# Patient Record
Sex: MALE | Race: WHITE | NOT HISPANIC OR LATINO
[De-identification: names, ages, dates, MRNs, and addresses within clinical notes are randomized per-mention and may not be internally consistent; named-entity substitution may affect disease eponyms.]

---

## 2021-03-23 PROBLEM — Z00.00 ENCOUNTER FOR PREVENTIVE HEALTH EXAMINATION: Status: ACTIVE | Noted: 2021-03-23

## 2022-10-24 ENCOUNTER — NON-APPOINTMENT (OUTPATIENT)
Age: 42
End: 2022-10-24

## 2022-10-24 DIAGNOSIS — I10 ESSENTIAL (PRIMARY) HYPERTENSION: ICD-10-CM

## 2022-10-24 RX ORDER — ATORVASTATIN CALCIUM 40 MG/1
40 TABLET, FILM COATED ORAL
Refills: 0 | Status: ACTIVE | COMMUNITY

## 2022-10-24 RX ORDER — METFORMIN HYDROCHLORIDE 1000 MG/1
1000 TABLET, COATED ORAL
Refills: 0 | Status: ACTIVE | COMMUNITY

## 2022-11-17 ENCOUNTER — APPOINTMENT (OUTPATIENT)
Dept: ENDOCRINOLOGY | Facility: CLINIC | Age: 42
End: 2022-11-17

## 2022-11-17 VITALS
TEMPERATURE: 97.7 F | OXYGEN SATURATION: 96 % | SYSTOLIC BLOOD PRESSURE: 130 MMHG | HEART RATE: 92 BPM | BODY MASS INDEX: 30.49 KG/M2 | HEIGHT: 70 IN | WEIGHT: 213 LBS | DIASTOLIC BLOOD PRESSURE: 64 MMHG

## 2022-11-17 PROCEDURE — 99214 OFFICE O/P EST MOD 30 MIN: CPT

## 2022-11-17 RX ORDER — LISINOPRIL 30 MG/1
TABLET ORAL
Refills: 0 | Status: DISCONTINUED | COMMUNITY
End: 2022-11-17

## 2022-11-17 NOTE — PROCEDURE
[FreeTextEntry1] : This is a 42-year-old white male with a past medical history of type 2 diabetes hyperlipidemia who is currently stable and tolerating all his medications.  He is very well compliant with his diet and exercise.  He has not had any episodes of hypoglycemia abdominal pain or dysuria.  He recently had a blood work on 10/20/2022 which showed that his complete metabolic panel is normal, total cholesterol was 115 mg per DL LDL is 64 mg per DL, and his hemoglobin A1c was 5.2%.  There is no evidence of any vascular complications at the present.  Recommendation\par 1.  Patient is to continue his current medication which include Jardiance 10 mg tablets every day, Trulicity 3 mg subcutaneously every week and metformin 1000 mg tablets daily.  Patient is also using atorvastatin 40 mg daily and lisinopril with hydrochlorothiazide.\par 2.  The importance of diet exercise and hypoglycemia was discussed with the patient.\par 3.  Follow-up visit has been arranged in approximately 4 to 5 months after he obtains a repeat blood test through the office of his primary care physician.  The plan was discussed in detail with the patient thank you

## 2022-11-17 NOTE — PHYSICAL EXAM
[Alert] : alert [Well Nourished] : well nourished [No Acute Distress] : no acute distress [Well Developed] : well developed [Normal Sclera/Conjunctiva] : normal sclera/conjunctiva [PERRL] : pupils equal, round and reactive to light [Normal Outer Ear/Nose] : the ears and nose were normal in appearance [No Neck Mass] : no neck mass was observed [Clear to Auscultation] : lungs were clear to auscultation bilaterally [Normal to Percussion] : lungs were normal to percussion [Normal S1, S2] : normal S1 and S2 [Normal Rate] : heart rate was normal [Regular Rhythm] : with a regular rhythm [Carotids Normal] : carotid pulses were normal with no bruits [No Edema] : no peripheral edema [Pedal Pulses Normal] : the pedal pulses are present [Normal Appearance] : normal in appearance [Normal Bowel Sounds] : normal bowel sounds [Not Tender] : non-tender [Not Distended] : not distended [No HSM] : no hepato-splenomegaly [Normal Supraclavicular Nodes] : no supraclavicular lymphadenopathy [No CVA Tenderness] : no ~M costovertebral angle tenderness [No Spinal Tenderness] : no spinal tenderness [No Stigmata of Cushings Syndrome] : no stigmata of Cushings Syndrome [Normal Gait] : normal gait [No Clubbing, Cyanosis] : no clubbing  or cyanosis of the fingernails [No Involuntary Movements] : no involuntary movements were seen [Normal Strength/Tone] : muscle strength and tone were normal [No Rash] : no rash [No Skin Lesions] : no skin lesions [No Sensory Deficits] : the sensory exam was normal to light touch and pinprick [Normal Reflexes] : deep tendon reflexes were 2+ and symmetric [No Tremors] : no tremors [Oriented x3] : oriented to person, place, and time [FreeTextEntry1] : Deferred [de-identified] : Deferred

## 2022-11-17 NOTE — HISTORY OF PRESENT ILLNESS
[FreeTextEntry1] : pt coming in to follow up on diabetes. \par Initially Young white male with a past medical history of type 2 diabetes hyperlipidemia and hypertension presents for routine follow-up.  Patient currently denies any significant symptoms.  Patient does not monitor his sugar levels at home mostly strictly compliant with his diet and exercise.  He has significantly reduced the intake of carbohydrates and starches, he is exercising on a daily basis and he has lost approximately 8 pounds since his last visit.  He denies any chest pain shortness of breath headache blurry vision nausea vomiting abdominal pain or dysuria.  He has not noticed any change in his vision or numbness of extremities.

## 2023-03-20 ENCOUNTER — APPOINTMENT (OUTPATIENT)
Dept: ENDOCRINOLOGY | Facility: CLINIC | Age: 43
End: 2023-03-20
Payer: COMMERCIAL

## 2023-03-20 VITALS
DIASTOLIC BLOOD PRESSURE: 60 MMHG | SYSTOLIC BLOOD PRESSURE: 128 MMHG | OXYGEN SATURATION: 97 % | HEART RATE: 90 BPM | WEIGHT: 214 LBS | BODY MASS INDEX: 30.64 KG/M2 | TEMPERATURE: 98.3 F | HEIGHT: 70 IN

## 2023-03-20 DIAGNOSIS — E66.9 OBESITY, UNSPECIFIED: ICD-10-CM

## 2023-03-20 PROCEDURE — 99213 OFFICE O/P EST LOW 20 MIN: CPT

## 2023-03-20 NOTE — ASSESSMENT
[Diabetes Foot Care] : diabetes foot care [Long Term Vascular Complications] : long term vascular complications of diabetes [Carbohydrate Consistent Diet] : carbohydrate consistent diet [Importance of Diet and Exercise] : importance of diet and exercise to improve glycemic control, achieve weight loss and improve cardiovascular health [Exercise/Effect on Glucose] : exercise/effect on glucose [Hypoglycemia Management] : hypoglycemia management [Retinopathy Screening] : Patient was referred to ophthalmology for retinopathy screening [FreeTextEntry1] : Young white male with a past medical history of a type 2 diabetes who is currently on oral agents together with a GLP analog.  Patient's last blood test from January 31, 2023 showed a normal complete metabolic panel hemoglobin A1c was 5.3% GFR is 102 magnesium is 1.9 TSH is 0.752 and the vitamin D level is 55.9.  Patient is clinically stable with excellent glycemic control.  Patient is trying very hard to maintain a normal weight and is following his diet very closely.  Recommendation\par 1.  I have advised the patient to continue his current diabetic medications Jardiance 10 mg daily metformin 1000 mg twice a day and Trulicity 3 mg once a week.  #2 patient will continue with the atorvastatin 40 mg daily and also lisinopril with hydrochlorothiazide 20/25 mg once a day.\par 2.  The importance of diet exercise weight loss was again stressed upon the patient.\par 3.  Side effects of the medications  were discussed with the patient and the prevention also was explained\par 4.  She will return to the office in 4 months time after having a repeat blood analysis.  Plan discussed with the patient thank you

## 2023-03-20 NOTE — HISTORY OF PRESENT ILLNESS
[FreeTextEntry1] : 43-year-old white male with a past medical history of type 2 diabetes, hypertension hyperlipidemia presents for routine follow-up.  Patient is currently taking Trulicity 3 mg every week, metformin 1000 mg twice a day Jardiance 10 mg daily and atorvastatin 40 mg daily.  He denies any significant symptoms of polyuria or polydipsia.  He is very compliant with his diet and has been avoiding concentrated starches in his meals.  Physically he is quite active and is exercising on a regular basis.  However his weight has not changed much.  He does not monitor his sugar levels at home.  He denies chest pain shortness of breath nausea vomiting dysuria.  His appetite has significantly diminished.

## 2023-03-30 ENCOUNTER — RX RENEWAL (OUTPATIENT)
Age: 43
End: 2023-03-30

## 2023-08-09 ENCOUNTER — APPOINTMENT (OUTPATIENT)
Dept: ENDOCRINOLOGY | Facility: CLINIC | Age: 43
End: 2023-08-09

## 2023-11-30 LAB — HBA1C MFR BLD HPLC: 5.3

## 2024-02-19 ENCOUNTER — APPOINTMENT (OUTPATIENT)
Dept: ENDOCRINOLOGY | Facility: CLINIC | Age: 44
End: 2024-02-19
Payer: COMMERCIAL

## 2024-02-19 VITALS
BODY MASS INDEX: 31.5 KG/M2 | DIASTOLIC BLOOD PRESSURE: 68 MMHG | WEIGHT: 220 LBS | RESPIRATION RATE: 16 BRPM | SYSTOLIC BLOOD PRESSURE: 128 MMHG | TEMPERATURE: 98.7 F | OXYGEN SATURATION: 97 % | HEART RATE: 110 BPM | HEIGHT: 70 IN

## 2024-02-19 DIAGNOSIS — E78.5 HYPERLIPIDEMIA, UNSPECIFIED: ICD-10-CM

## 2024-02-19 DIAGNOSIS — E11.9 TYPE 2 DIABETES MELLITUS W/OUT COMPLICATIONS: ICD-10-CM

## 2024-02-19 PROCEDURE — 99214 OFFICE O/P EST MOD 30 MIN: CPT

## 2024-02-19 PROCEDURE — G2211 COMPLEX E/M VISIT ADD ON: CPT

## 2024-02-19 RX ORDER — DULAGLUTIDE 1.5 MG/.5ML
1.5 INJECTION, SOLUTION SUBCUTANEOUS
Qty: 12 | Refills: 1 | Status: DISCONTINUED | COMMUNITY
Start: 2023-01-30 | End: 2024-02-19

## 2024-02-19 RX ORDER — LISINOPRIL AND HYDROCHLOROTHIAZIDE TABLETS 20; 25 MG/1; MG/1
20-25 TABLET ORAL
Refills: 0 | Status: ACTIVE | COMMUNITY

## 2024-02-19 NOTE — ASSESSMENT
[Diabetes Foot Care] : diabetes foot care [Long Term Vascular Complications] : long term vascular complications of diabetes [Carbohydrate Consistent Diet] : carbohydrate consistent diet [Importance of Diet and Exercise] : importance of diet and exercise to improve glycemic control, achieve weight loss and improve cardiovascular health [Exercise/Effect on Glucose] : exercise/effect on glucose [Hypoglycemia Management] : hypoglycemia management [Self Monitoring of Blood Glucose] : self monitoring of blood glucose [Retinopathy Screening] : Patient was referred to ophthalmology for retinopathy screening [FreeTextEntry1] : -year-old white male who has a past medical history of for type 2 diabetes currently on oral medication together with Trulicity which she is tolerating well.  His last blood test was performed on December 27, 2023 and hemoglobin A1c level was 5.6%.  Lipid panel showed total cholesterol of 148 HDL of 41 and LDL of 84 mg per DL.  TSH was 0.630 complete metabolic panel was normal.  I previous hemoglobin A1c drawn August 9, 2023 was 5.3%..  Patient is in excellent glycemic control and is well compliant with his dietary and exercise regimen.  He is tolerating the medications well without any side effects.  Recommendation 1.  I have advised the patient to continue his current management which includes Jardiance 10 mg daily, metformin 1000 mg twice a day and Trulicity 3 mg solution every week subcutaneous. 2.  Patient will continue with his diet and exercise regimen. 3.  Foot care was discussed with the patient. 4.  If clinically stable then he will return to the office in approximately 4 to 5 months with a repeat blood analysis.  The plan was discussed in detail with the patient thank you

## 2024-02-19 NOTE — HISTORY OF PRESENT ILLNESS
[FreeTextEntry1] : Patient last seen 3/20/2023 most recent labs dated for 12/6/2023 from [facility]. Weigh today 220. 44-year-old young white male with a past medical history of for type 2 diabetes, primary hypertension and hyperlipidemia presents for routine follow-up.  According to the patient he has been following his diet and has been exercising on a regular basis under the supervision of a .  Generally he feels very good and energy level is well-preserved.  His vision has remained quite stable he sees ophthalmologist every 6 months.  Denies any numbness or tingling of the extremities.  He has not noticed any significant polyuria and nocturia or dysuria.  He has not had any nausea vomiting abdominal pain.  Current medications metformin 1000 mg twice a day, Trulicity 3.0 mg every week Jardiance 10 mg daily and atorvastatin 40 mg daily.  Review of systems otherwise negative.

## 2024-03-01 ENCOUNTER — RX RENEWAL (OUTPATIENT)
Age: 44
End: 2024-03-01

## 2024-03-01 RX ORDER — EMPAGLIFLOZIN 10 MG/1
10 TABLET, FILM COATED ORAL DAILY
Qty: 90 | Refills: 3 | Status: ACTIVE | COMMUNITY
Start: 1900-01-01 | End: 1900-01-01

## 2024-04-09 RX ORDER — DULAGLUTIDE 3 MG/.5ML
3 INJECTION, SOLUTION SUBCUTANEOUS
Qty: 3 | Refills: 1 | Status: ACTIVE | COMMUNITY
Start: 1900-01-01 | End: 1900-01-01

## 2024-07-16 ENCOUNTER — APPOINTMENT (OUTPATIENT)
Dept: ENDOCRINOLOGY | Facility: CLINIC | Age: 44
End: 2024-07-16